# Patient Record
Sex: FEMALE | Race: WHITE | Employment: UNEMPLOYED | URBAN - METROPOLITAN AREA
[De-identification: names, ages, dates, MRNs, and addresses within clinical notes are randomized per-mention and may not be internally consistent; named-entity substitution may affect disease eponyms.]

---

## 2017-09-20 ENCOUNTER — HOSPITAL ENCOUNTER (EMERGENCY)
Age: 22
Discharge: HOME | End: 2017-09-20
Attending: EMERGENCY MEDICINE
Payer: MEDICAID

## 2017-09-20 ENCOUNTER — APPOINTMENT (OUTPATIENT)
Dept: ULTRASOUND IMAGING | Age: 22
End: 2017-09-20
Payer: MEDICAID

## 2017-09-20 VITALS
SYSTOLIC BLOOD PRESSURE: 115 MMHG | OXYGEN SATURATION: 98 % | HEART RATE: 94 BPM | BODY MASS INDEX: 30.14 KG/M2 | DIASTOLIC BLOOD PRESSURE: 60 MMHG | WEIGHT: 187.56 LBS | HEIGHT: 66 IN | TEMPERATURE: 97.9 F | RESPIRATION RATE: 18 BRPM

## 2017-09-20 DIAGNOSIS — O20.0 THREATENED MISCARRIAGE: Primary | ICD-10-CM

## 2017-09-20 LAB
ABO/RH: NORMAL
ABSOLUTE EOS #: 0.2 K/UL (ref 0–0.4)
ABSOLUTE LYMPH #: 2.3 K/UL (ref 1.2–5.2)
ABSOLUTE MONO #: 0.8 K/UL (ref 0.2–0.8)
ANION GAP SERPL CALCULATED.3IONS-SCNC: 11 MMOL/L (ref 9–17)
BASOPHILS # BLD: 1 %
BASOPHILS ABSOLUTE: 0.1 K/UL (ref 0–0.2)
BUN BLDV-MCNC: 7 MG/DL (ref 6–20)
BUN/CREAT BLD: 16 (ref 9–20)
CALCIUM SERPL-MCNC: 8.8 MG/DL (ref 8.6–10.4)
CHLORIDE BLD-SCNC: 101 MMOL/L (ref 98–107)
CO2: 22 MMOL/L (ref 20–31)
CREAT SERPL-MCNC: 0.44 MG/DL (ref 0.5–0.9)
DIFFERENTIAL TYPE: ABNORMAL
DIRECT EXAM: NORMAL
EOSINOPHILS RELATIVE PERCENT: 1 %
GFR AFRICAN AMERICAN: >60 ML/MIN
GFR NON-AFRICAN AMERICAN: >60 ML/MIN
GFR SERPL CREATININE-BSD FRML MDRD: ABNORMAL ML/MIN/{1.73_M2}
GFR SERPL CREATININE-BSD FRML MDRD: ABNORMAL ML/MIN/{1.73_M2}
GLUCOSE BLD-MCNC: 82 MG/DL (ref 70–99)
HCT VFR BLD CALC: 33.7 % (ref 36–46)
HEMOGLOBIN: 11.4 G/DL (ref 12–16)
LYMPHOCYTES # BLD: 19 %
Lab: NORMAL
MCH RBC QN AUTO: 29.8 PG (ref 26–34)
MCHC RBC AUTO-ENTMCNC: 33.9 G/DL (ref 31–37)
MCV RBC AUTO: 87.9 FL (ref 80–100)
MONOCYTES # BLD: 6 %
PDW BLD-RTO: 14 % (ref 11.5–14.5)
PLATELET # BLD: 283 K/UL (ref 130–400)
PLATELET ESTIMATE: ABNORMAL
PMV BLD AUTO: 7.1 FL (ref 6–12)
POTASSIUM SERPL-SCNC: 3.9 MMOL/L (ref 3.7–5.3)
RBC # BLD: 3.83 M/UL (ref 4–5.2)
RBC # BLD: ABNORMAL 10*6/UL
SEG NEUTROPHILS: 73 %
SEGMENTED NEUTROPHILS ABSOLUTE COUNT: 9.3 K/UL (ref 1.8–8)
SODIUM BLD-SCNC: 134 MMOL/L (ref 135–144)
SPECIMEN DESCRIPTION: NORMAL
STATUS: NORMAL
WBC # BLD: 12.7 K/UL (ref 4.5–13.5)
WBC # BLD: ABNORMAL 10*3/UL

## 2017-09-20 PROCEDURE — 99284 EMERGENCY DEPT VISIT MOD MDM: CPT

## 2017-09-20 PROCEDURE — 87510 GARDNER VAG DNA DIR PROBE: CPT

## 2017-09-20 PROCEDURE — 87480 CANDIDA DNA DIR PROBE: CPT

## 2017-09-20 PROCEDURE — 86900 BLOOD TYPING SEROLOGIC ABO: CPT

## 2017-09-20 PROCEDURE — 86901 BLOOD TYPING SEROLOGIC RH(D): CPT

## 2017-09-20 PROCEDURE — 76815 OB US LIMITED FETUS(S): CPT

## 2017-09-20 PROCEDURE — 80048 BASIC METABOLIC PNL TOTAL CA: CPT

## 2017-09-20 PROCEDURE — 85025 COMPLETE CBC W/AUTO DIFF WBC: CPT

## 2017-09-20 PROCEDURE — 87591 N.GONORRHOEAE DNA AMP PROB: CPT

## 2017-09-20 PROCEDURE — 87660 TRICHOMONAS VAGIN DIR PROBE: CPT

## 2017-09-20 PROCEDURE — 87491 CHLMYD TRACH DNA AMP PROBE: CPT

## 2017-09-20 ASSESSMENT — ENCOUNTER SYMPTOMS
SHORTNESS OF BREATH: 0
BACK PAIN: 0
ABDOMINAL PAIN: 0
NAUSEA: 0
DIARRHEA: 0
VOMITING: 0
COLOR CHANGE: 0
COUGH: 0

## 2017-09-21 LAB
C TRACH DNA GENITAL QL NAA+PROBE: NEGATIVE
N. GONORRHOEAE DNA: NEGATIVE

## 2017-10-04 ENCOUNTER — APPOINTMENT (OUTPATIENT)
Dept: GENERAL RADIOLOGY | Age: 22
End: 2017-10-04
Payer: MEDICAID

## 2017-10-04 ENCOUNTER — HOSPITAL ENCOUNTER (EMERGENCY)
Age: 22
Discharge: HOME | End: 2017-10-04
Attending: EMERGENCY MEDICINE
Payer: MEDICAID

## 2017-10-04 VITALS
WEIGHT: 180 LBS | BODY MASS INDEX: 29.05 KG/M2 | TEMPERATURE: 97.5 F | HEART RATE: 88 BPM | RESPIRATION RATE: 16 BRPM | DIASTOLIC BLOOD PRESSURE: 57 MMHG | SYSTOLIC BLOOD PRESSURE: 99 MMHG | OXYGEN SATURATION: 97 %

## 2017-10-04 DIAGNOSIS — S91.312A: Primary | ICD-10-CM

## 2017-10-04 PROCEDURE — 12001 RPR S/N/AX/GEN/TRNK 2.5CM/<: CPT

## 2017-10-04 PROCEDURE — G0381 LEV 2 HOSP TYPE B ED VISIT: HCPCS

## 2017-10-04 PROCEDURE — 6370000000 HC RX 637 (ALT 250 FOR IP): Performed by: PHYSICIAN ASSISTANT

## 2017-10-04 PROCEDURE — 73630 X-RAY EXAM OF FOOT: CPT

## 2017-10-04 RX ORDER — ACETAMINOPHEN 325 MG/1
650 TABLET ORAL ONCE
Status: COMPLETED | OUTPATIENT
Start: 2017-10-04 | End: 2017-10-04

## 2017-10-04 RX ORDER — LIDOCAINE HYDROCHLORIDE 10 MG/ML
20 INJECTION, SOLUTION INFILTRATION; PERINEURAL ONCE
Status: DISCONTINUED | OUTPATIENT
Start: 2017-10-04 | End: 2017-10-04 | Stop reason: HOSPADM

## 2017-10-04 RX ADMIN — ACETAMINOPHEN 650 MG: 325 TABLET ORAL at 18:48

## 2017-10-04 ASSESSMENT — ENCOUNTER SYMPTOMS
ALLERGIC/IMMUNOLOGIC NEGATIVE: 1
GASTROINTESTINAL NEGATIVE: 1
EYES NEGATIVE: 1
RESPIRATORY NEGATIVE: 1

## 2017-10-04 ASSESSMENT — PAIN SCALES - GENERAL
PAINLEVEL_OUTOF10: 7
PAINLEVEL_OUTOF10: 7

## 2017-10-04 NOTE — ED PROVIDER NOTES
[facility]     Emergency Department     Faculty Attestation    I performed a history and physical examination of the patient and discussed management with the resident. I reviewed the residents note and agree with the documented findings including all diagnostic interpretations and plan of care. Any areas of disagreement are noted on the chart. I was personally present for the key portions of any procedures. I have documented in the chart those procedures where I was not present during the key portions. I have reviewed the emergency nurses triage note. I agree with the chief complaint, past medical history, past surgical history, allergies, medications, social and family history as documented unless otherwise noted below. Documentation of the HPI, Physical Exam and Medical Decision Making performed by millaibdago is based on my personal performance of the HPI, PE and MDM. For Physician Assistant/ Nurse Practitioner cases/documentation I have personally evaluated this patient and have completed at least one if not all key elements of the E/M (history, physical exam, and MDM). Additional findings are as noted. Primary Care Physician: No primary care provider on file. History: This is a 23 y.o. female who presents to the Emergency Department with complaint of foot laceration. Patient cut her foot on broken porcelain sink that was thrown into her front yard. Denies any numbness or tingling, no weakness, no blood thinners. No other injuries. Tetanus is up-to-date. Physical:     weight is 180 lb (81.6 kg). Her oral temperature is 97.5 °F (36.4 °C). Her blood pressure is 99/57 (abnormal) and her pulse is 88. Her respiration is 16 and oxygen saturation is 97%.    23 y.o. female no acute distress, left foot shows a 1.5 cm linear laceration along the superior aspect of the arch of the foot.   Sensation is intact in all 5 toes, able flex and extend the toes with no limitation, no active bleeding, capillary refill less than 2 seconds, DP pulse 2+. Impression: Foot laceration    Plan: X-ray for foreign body, wound explanation, laceration repair      Yamileth Shepherd MD  Attending Emergency Physician        Wing Peacock MD  10/04/17 1823      7:52 PM laceration repair performed by medical student under my direct supervision. We will discharge at this time, follow-up in 7-10 days for suture removal at PCP or in emergency department. Laceration Repair Procedure Note    Indication: Laceration    Procedure: The patient was placed in the appropriate position and anesthesia around the laceration was obtained by infiltration using 1% Lidocaine without epinephrine. The area was then cleansed with betadine and draped in a sterile fashion, irrigated with normal saline and explored with no tendon injury noted. The laceration was closed with 4-0 Prolene using interrupted sutures. There were no additional lacerations requiring repair. The wound area was then dressed with bacitracin. Total repaired wound length: 1.5 cm. Other Items: Suture count: 3    The patient tolerated the procedure well.     Complications: None           Wing Peacock MD  10/04/17 9622

## 2017-10-04 NOTE — ED PROVIDER NOTES
[facility]  ED  Emergency Department Encounter  Mid Level Provider     Pt Name: Sally Estes  MRN: [id number]  [place] 1995  Date of evaluation: 10/4/17  PCP:  No primary care provider on file. CHIEF COMPLAINT       Chief Complaint   Patient presents with    Laceration     Medial left foot, plantar aspect, approc 2-3 cm. HISTORY OF PRESENT ILLNESS  (Location/Symptom, Timing/Onset, Context/Setting, Quality, Duration, Modifying Factors, Severity.)      iGselle Mosqueda is a 23 y.o. female who presents with Laceration to the left foot. Patient states that she had carried groceries and was trying to walk into her house, but the sink had been placed into her yard and she was trying to step around it and ended up tripping and cutting her left foot, plantar surface on the same. Her  wrapped her foot in a pillowcase and brought her here. She doesn't have any medical problems. She is about 20-23 weeks pregnant. She has not yet gotten [employer] on following here at [facility]. V's. Denies allergies. States her last tetanus was one to 2 years ago. Patient is not having any abdominal pain, vaginal bleeding or discharge today. PAST MEDICAL / SURGICAL / SOCIAL / FAMILY HISTORY      has no past medical history on file. has no past surgical history on file. Social History     Social History    Marital status: Single     Spouse name: N/A    Number of children: N/A    Years of education: N/A     Occupational History    Not on file. Social History Main Topics    Smoking status: Never Smoker    Smokeless tobacco: Not on file    Alcohol use No    Drug use: No    Sexual activity: Not on file     Other Topics Concern    Not on file     Social History Narrative       History reviewed. No pertinent family history. Allergies:  Review of patient's allergies indicates no known allergies.     Home Medications:  Prior to Admission medications    Not on File       REVIEW OF SYSTEMS    (2-9 systems for level 4, 10 or more for level 5)      Review of Systems   Constitutional: Negative. HENT: Negative. Eyes: Negative. Respiratory: Negative. Cardiovascular: Negative. Gastrointestinal: Negative. Endocrine: Negative. Genitourinary: Negative. Musculoskeletal: Negative. Skin: Negative. Laceration to left foot, dorsal medial aspect   Allergic/Immunologic: Negative. Neurological: Negative. Hematological: Negative. Psychiatric/Behavioral: Negative. PHYSICAL EXAM   (up to 7 for level 4, 8 or more for level 5)      INITIAL VITALS:  weight is 180 lb (81.6 kg). Her oral temperature is 97.5 °F (36.4 °C). Her blood pressure is 99/57 (abnormal) and her pulse is 88. Her respiration is 16 and oxygen saturation is 97%. Physical Exam   Constitutional: She is oriented to person, place, and time. Vital signs are normal. She appears well-developed and well-nourished. HENT:   Head: Normocephalic and atraumatic. Nose: Nose normal.   Mouth/Throat: Mucous membranes are normal.   Eyes: Conjunctivae are normal. Pupils are equal, round, and reactive to light. Neck: Trachea normal, normal range of motion and full passive range of motion without pain. Cardiovascular: S1 normal, S2 normal and intact distal pulses. Pulses:       Radial pulses are 2+ on the right side, and 2+ on the left side. Pulmonary/Chest: Effort normal.   Abdominal: Normal appearance. Musculoskeletal: Normal range of motion. Neurological: She is alert and oriented to person, place, and time. She has normal strength. Skin: Skin is warm and intact. Laceration to the left foot, present over the dorsal medial aspect of the foot but not over any of the extensor tendons. The laceration is approximately 1.5 cm it is not actively oozing any blood. Patient has full dorsi and plantar flexion of all the digits of her left foot with brisk capillary refill.   2+ DP and PT pulses Psychiatric: She has a normal mood and affect. Her speech is normal and behavior is normal. Judgment and thought content normal.   Nursing note and vitals reviewed. DIFFERENTIAL  DIAGNOSIS   Laceration of left foot. Retained foreign body of the left foot  Fracture of left first metatarsal  PLAN (LABS / IMAGING / EKG):  Orders Placed This Encounter   Procedures    XR FOOT LEFT (MIN 3 VIEWS)       MEDICATIONS ORDERED:  Orders Placed This Encounter   Medications    acetaminophen (TYLENOL) tablet 650 mg    lidocaine 1 % injection 20 mL       Controlled Substances Monitoring:      DIAGNOSTIC RESULTS / EMERGENCY DEPARTMENT COURSE / MDM     RADIOLOGY:   I directly visualized (with the attending physician) the following  images and reviewed the radiologist interpretations:  Us Ob 1 Or More Fetus Limited    Result Date: 9/20/2017  EXAMINATION: 2nd trimester OB ultrasound 9/20/2017 COMPARISON: None HISTORY: ORDERING SYSTEM PROVIDED HISTORY: bleeding in pregnancy FINDINGS: A single live intrauterine pregnancy is present. Fetal heart rate measures 159 beats per minute. There is normal fetal body and limb movement. The fetus lie is variable with cephalic and breech positions noted. The placenta is located posterior and left. BPD measures 4.4 cm. Head circumference measures 16.8 cm. Abdominal circumference measures 17.1 cm. Femur length measures 3.1 cm. Estimated fetal weight is 370.3 grams. Estimated gestational age by current ultrasound is 20 weeks and 1 day. Cervical length is 3.3 cm. Questionable cervical funneling. Single live intrauterine pregnancy with gestational age of 25 weeks and 1 day by current sonographic biometry. The estimated due date is February 6, 2018. Questionable funneling of the cervix. LABS:  No results found for this visit on 10/04/17.     EMERGENCY DEPARTMENT COURSE:  Left foot xray, tetanus was updated about 1-2 years ago, is 22-23wks pregnant with no abdominal pain, vag bleeding or vag discharge    PROCEDURES:  None    FINAL IMPRESSION      1. Laceration of left foot excluding toes, initial encounter          DISPOSITION / PLAN     DISPOSITION   Sign out to Dr. Barby Henderson pending x-ray and laceration repair  PATIENT REFERRED TO:  No follow-up provider specified.     DISCHARGE MEDICATIONS:  New Prescriptions    No medications on file       Federico Babb PA-C PA-C  Emergency Medicine Physician Assistant    (Please note that portions of this note were completed with a voice recognition program.  Efforts were made to edit the dictations but occasionally words are mis-transcribed.)     Federico Babb PA-C  10/04/17 2095

## 2017-10-04 NOTE — ED AVS SNAPSHOT
After Visit Summary  (Discharge Instructions)    Medication List for Home    Based on the information you provided to us as well as any changes during this visit, the following is your updated medication list.  Compare this with your prescription bottles at home. If you have any questions or concerns, contact your primary care physician's office. Daily Medication List (This medication list can be shared with any Healthcare provider who is helping you manage your medications)      Notice     You have not been prescribed any medications. Allergies as of 10/4/2017     No Known Allergies      Immunizations as of 10/4/2017     No immunizations on file. After Visit Summary    This summary was created for you. Thank you for entrusting your care to us. The following information includes details about your hospital/visit stay along with steps you should take to help with your recovery once you leave the hospital.  In this packet, you will find information about the topics listed below:    · Instructions about your medications including a list of your home medications  · A summary of your hospital visit  · Follow-up appointments once you have left the hospital  · Your care plan at home      You may receive a survey regarding the care you received during your stay. Your input is valuable to us. We encourage you to complete and return your survey in the envelope provided. We hope you will choose us in the future for your healthcare needs. Patient Information     Patient Name JASMINA Alvarez 1995      Care Provided at:     Name Address Phone       [facility] [address] [facility] [phone]            Your Visit    Here you will find information about your visit, including the reason for your visit.   Please take this sheet with you when you visit your doctor or other health care provider in the future. It will help determine the best possible medical care for you at that time. If you have any questions once you leave the hospital, please call the department phone number listed below. Diagnoses this visit     Your diagnosis was LACERATION OF LEFT FOOT EXCLUDING TOES, INITIAL ENCOUNTER. Visit Information     Date of Visit Department Dept Phone    10/4/2017 [facility] -[phone]      You were seen by     You were seen by Chrystal Workman MD and Silvino Garcia PA-C. Follow-up Appointments    Below is a list of your follow-up and future appointments. This may not be a complete list as you may have made appointments directly with providers that we are not aware of or your providers may have made some for you. Please call your providers to confirm appointments. It is important to keep your appointments. Please bring your current insurance card, photo ID, co-pay, and all medication bottles to your appointment. If self-pay, payment is expected at the time of service. Follow-up Information     Follow up with [facility] ED In 1 week. Specialty:  Emergency Medicine    Why:  For suture removal    Contact information:    [address] [place] [address] [phone]        Follow up with PCP In 1 week. Why:  For suture removal      Preventive Care        Date Due    HIV screening is recommended for all people regardless of risk factors  aged 15-65 years at least once (lifetime) who have never been HIV tested.  3/12/2012    Meningococcal Vaccine (1 of 1) 3/12/2013    Tetanus Combination Vaccine (1 - Tdap) 3/12/2016    Yearly Flu Vaccine (1) 9/1/2017                 Care Plan Once You Return Home    This section includes instructions you will need to follow once you leave the hospital.  Your care team will discuss these with you, so you and 6. Create a CrossReader password. You can change your password at any time. 7. Enter your Password Reset Question and Answer. This can be used at a later time if you forget your password. 8. Enter your e-mail address. You will receive e-mail notification when new information is available in [address] 9. Click Sign Up. You can now view your medical record. Additional Information  If you have questions, please contact the physician practice where you receive care. Remember, CrossReader is NOT to be used for urgent needs. For medical emergencies, dial 911. For questions regarding your [employer] account call [phone]. If you have a clinical question, please call your doctor's office. View your information online  ? Review your current list of  medications, immunization, and allergies. ? Review your future test results online . ? Review your discharge instructions provided by your caregivers at discharge    Certain functionality such as prescription refills, scheduling appointments or sending messages to your provider are not activated if your provider does not use [employer] in his/her office    For questions regarding your [employer] account call [phone]. If you have a clinical question, please call your doctor's office. The information on all pages of the After Visit Summary has been reviewed with me, the patient and/or responsible adult, by my health care provider(s). I had the opportunity to ask questions regarding this information. I understand I should dispose of my armband safely at home to protect my health information. A complete copy of the After Visit Summary has been given to me, the patient and/or responsible adult.          Patient Signature/Responsible Adult: ___________________________________    Nurse Signature: ___________________________________  Resident/MLP Signature: ___________________________________  Attending Signature: ___________________________________ Date:____________Time:____________              Discharge Instructions       THANK YOU!!!    From [facility]    On behalf of the Emergency Department staff at [facility]. [facility] Emergency Department, I would like to thank you for giving [facility] the opportunity to address your health care needs and concerns. We hope that during your visit, our service was delivered in a professional and caring manner. Please keep [facility] in mind as we walk with you down the path to your own personal wellness. Call today or tomorrow to follow up with PCP  in 7-10 days or return to the ER to have your sutures removed. Use ibuprofen or Tylenol (unless prescribed medications that have Tylenol in it) for pain. You can take over the counter Ibuprofen (advil) tablets (4 tablets every 8 hours or 3 tablets every 6 hours or 2 tablets every 4 hours)    You can shower with the laceration, would avoid baths or swimming in lakes / rivers. Apply bacitracin / triple antibiotic ointment / Neosporin to the wound twice a day. Place sunscreen on the healing wound for the next year to help with scarring. Return to the emergency department for worsening of pain, fever > 101.5, redness around the wound or redness streaking up the body part, white drainage from wound. Cuts Closed With Stitches: Care Instructions  Your Care Instructions  A cut can happen anywhere on your body. The doctor used stitches to close the cut. Using stitches also helps the cut heal and reduces scarring. Sometimes pieces of tape called Steri-Strips are put over the stitches. If the cut went deep and through the skin, the doctor may have put in two layers of stitches. The deeper layer brings the deep part of the cut together. These stitches will dissolve and don't need to be removed. The stitches in the upper layer are the ones you see on the cut. Call your doctor now or seek immediate medical care if:  · You have new pain, or your pain gets worse. · The skin near the cut is cold or pale or changes color. · You have tingling, weakness, or numbness near the cut. · The cut starts to bleed, and blood soaks through the bandage. Oozing small amounts of blood is normal.  · You have trouble moving the area near the cut. · You have symptoms of infection, such as:  ¨ Increased pain, swelling, warmth, or redness around the cut. ¨ Red streaks leading from the cut. ¨ Pus draining from the cut. ¨ A fever. Watch closely for changes in your health, and be sure to contact your doctor if:  · The cut reopens. · You do not get better as expected. Where can you learn more? Go to [email or web address] and sign in to your [employer] account. Enter R217 in the Matchpoint box to learn more about \"Cuts Closed With Stitches: Care Instructions. \"     If you do not have an account, please click on the \"Sign Up Now\" link. Current as of: March 20, 2017  Content Version: 11.3  © 2647-6825 [employer]. Care instructions adapted under license by [facility] ([facility]). If you have questions about a medical condition or this instruction, always ask your healthcare professional. [address] any warranty or liability for your use of this information.     [facility] ED [facility]  [address] [facility]  [phone] Pediatric Primary Care  Adult Primary Care  OB/GYN/Prenatal/Specialty Clinics Monday  Friday  [address] [phone] Assistance with applying for chronic long term meds (high BP, Diabetes, etc), offered thru programs made available by various pharmaceutical companies Monday  Friday  Call to make an appointment   [address]  [phone] Pediatrics and [facility] Practice Monday  Friday  9a  7p [address] [facility]  [address]  [phone] Adult Medicine, Pediatrics, OB/GYN Monday  Friday  8:30a  5p   [facility]  [address] [facility]  [phone]  Wednesday AM each week   [facility]  [address] [facility] Internal Medicine   REHABILITATION [facility]  [phone]  [facility]  [phone]    [facility]  [phone]  [phone] Monday, Tuesday, Thursday, Friday  8a  4p  Wednesday 1p  4p  Monday, Tuesday, Thursday, Friday  10a  4p  Wednesday 1p  4p  Monday, Tuesday, Thursday, Friday  8:30a  4:15p  Wednesday 12:30p  4:15p  Monday, Wednesday, Friday  1p  5p   [address] [facility] [address]  [phone] Pediatric Primary Care  Adult Primary Care  OB/Prenatal Monday  Wednesday, Friday Monday  Friday 8a  12p  Thursday 8a  4:45p   [facility]  [address] [facility]  [phone]  [address] [place] [address]  [phone] Pregnancy  Pre & Post Adoption Counseling  Pregnancy Support  Prenatal / nutrition Care  Reward Incentive Program Mon & Thurs (10a  2p)  Tues (10a  430p)  Cheng Nicole ([address] [facility] 1p)   [address]  [address]   [phone]  Adult Internal Medicine   [phone]  Pediatrics  [phone]  Neuro / Headache  [phone] Monday  Friday  8:30a  5p   [facility]  [address]  [phone] Family Practice Monday  Friday  9a  5p   [facility]  [address] [facility]  [phone] Family Practice Monday, Tuesday, Thursday, Friday  9a  5p  (closed 12p  1p)  Wednesday 1p  5p   [address]  [phone] Burn/Plastric, ENT, GI, Orthopedics, Surgical / Trauma, Urology, Vascular Call for an appointment   [facility]  [address]  [phone] Adult Medicine, [address] [facility], Dental  Patient must be certified homeless  Under age 25 not accepted Days and hours vary (Doors open at 8:30a  day of week varies) Call for an appointment     [facility]  [address] [facility]   [phone]  OB   [phone] Monday, Tuesday, Wednesday, Friday 9a  5p  Thursday 9a  6p  Wednesday (OB only)   [facility]  [address]  [phone] [place]  [phone]   OB/Prenatal      OB/Prenatal Monday  Thursday pa  6p  Friday 10a  3p  Saturday 1st & 3rd 10a  2p  Wednesday 6p  8p (HIV Testing)  Monday  Friday  10a  3p   Pregnancy Center [facility]  [phone] Free nurse visits  [place] programs  Counseling  Pregnancy Class Call   [facility]  [phone] Various Clinics [facility]  [facility] [address], [address] [facility]  [phone] OB/Prenatal    Family Practice Tuesday 8a  4:45p    Monday  Wednesday, Friday  8a  4:45p   [address]  [address]  [phone] Family Practice Monday  Friday  8a  4:30p   [facility]  [address] [facility], [address]  [phone] [address] [facility], [facility] [address]  [phone]    Monday  Friday  8a  4:30p    Monday  Friday 8a  4:30p  Thursday 8a  [address] Professional Stuart Marcial  [phone]  Monday  Friday  9a  5p   [facility]  [phone]  Call for an appointment   [address]   [address]  [phone]  Monday  Friday  8:30a [address] [facility]  [address]  [phone] Must have source of income and must bring (2) recent check stubs to appointment By appointment only   [facility] for [facility]  [address] [facility]  [phone] Patient must be homeless, call for   eligibility guidelines.   Under age 25 NOT accepted Days and hours vary (Doors open at 8:30a  day of week varies)  Call for an appointment Miscellaneous Information     [employer]  [phone])  Call for an appointment   [facility]   ([address] [place] [address])  [phone]   toll free [phone] For financial assistance

## 2017-11-20 ENCOUNTER — HOSPITAL ENCOUNTER (EMERGENCY)
Age: 22
Discharge: ANOTHER HOSPITAL | End: 2017-11-21
Attending: EMERGENCY MEDICINE
Payer: COMMERCIAL

## 2017-11-20 DIAGNOSIS — O60.03 PRETERM LABOR IN THIRD TRIMESTER WITHOUT DELIVERY: Primary | ICD-10-CM

## 2017-11-20 PROCEDURE — 99284 EMERGENCY DEPT VISIT MOD MDM: CPT

## 2017-11-20 RX ORDER — SODIUM CHLORIDE 9 MG/ML
INJECTION, SOLUTION INTRAVENOUS CONTINUOUS
Status: DISCONTINUED | OUTPATIENT
Start: 2017-11-21 | End: 2017-11-21 | Stop reason: HOSPADM

## 2017-11-21 ENCOUNTER — HOSPITAL ENCOUNTER (OUTPATIENT)
Age: 22
Discharge: HOME | End: 2017-11-21
Attending: OBSTETRICS & GYNECOLOGY | Admitting: OBSTETRICS & GYNECOLOGY
Payer: COMMERCIAL

## 2017-11-21 VITALS
HEART RATE: 90 BPM | RESPIRATION RATE: 14 BRPM | SYSTOLIC BLOOD PRESSURE: 116 MMHG | DIASTOLIC BLOOD PRESSURE: 59 MMHG | TEMPERATURE: 98 F

## 2017-11-21 VITALS
OXYGEN SATURATION: 97 % | WEIGHT: 211.25 LBS | DIASTOLIC BLOOD PRESSURE: 66 MMHG | HEART RATE: 105 BPM | BODY MASS INDEX: 34.1 KG/M2 | TEMPERATURE: 98.3 F | SYSTOLIC BLOOD PRESSURE: 127 MMHG

## 2017-11-21 PROBLEM — Z33.1 IUP (INTRAUTERINE PREGNANCY), INCIDENTAL: Status: ACTIVE | Noted: 2017-11-21

## 2017-11-21 PROBLEM — Z3A.29 29 WEEKS GESTATION OF PREGNANCY: Status: ACTIVE | Noted: 2017-11-21

## 2017-11-21 PROBLEM — Z34.90 PREGNANCY: Status: ACTIVE | Noted: 2017-11-21

## 2017-11-21 LAB
-: ABNORMAL
AMORPHOUS: ABNORMAL
BACTERIA: ABNORMAL
BILIRUBIN URINE: NEGATIVE
C TRACH DNA GENITAL QL NAA+PROBE: NEGATIVE
CASTS UA: ABNORMAL /LPF (ref 0–2)
COLOR: YELLOW
COMMENT UA: ABNORMAL
CRYSTALS, UA: ABNORMAL /HPF
DIRECT EXAM: ABNORMAL
EPITHELIAL CELLS UA: ABNORMAL /HPF (ref 0–5)
GLUCOSE URINE: NEGATIVE
KETONES, URINE: NEGATIVE
LEUKOCYTE ESTERASE, URINE: ABNORMAL
Lab: ABNORMAL
MUCUS: ABNORMAL
N. GONORRHOEAE DNA: NEGATIVE
NITRITE, URINE: NEGATIVE
OTHER OBSERVATIONS UA: ABNORMAL
PH UA: 6.5 (ref 5–8)
PROTEIN UA: NEGATIVE
RBC UA: ABNORMAL /HPF (ref 0–2)
RENAL EPITHELIAL, UA: ABNORMAL /HPF
SPECIFIC GRAVITY UA: 1 (ref 1–1.03)
SPECIMEN DESCRIPTION: ABNORMAL
STATUS: ABNORMAL
TRICHOMONAS: ABNORMAL
TURBIDITY: CLEAR
URINE HGB: NEGATIVE
UROBILINOGEN, URINE: NORMAL
WBC UA: ABNORMAL /HPF (ref 0–5)
YEAST: ABNORMAL

## 2017-11-21 PROCEDURE — 2580000003 HC RX 258: Performed by: EMERGENCY MEDICINE

## 2017-11-21 PROCEDURE — 87480 CANDIDA DNA DIR PROBE: CPT

## 2017-11-21 PROCEDURE — 81001 URINALYSIS AUTO W/SCOPE: CPT

## 2017-11-21 PROCEDURE — 99213 OFFICE O/P EST LOW 20 MIN: CPT

## 2017-11-21 PROCEDURE — 96365 THER/PROPH/DIAG IV INF INIT: CPT

## 2017-11-21 PROCEDURE — 99234 HOSP IP/OBS SM DT SF/LOW 45: CPT | Performed by: OBSTETRICS & GYNECOLOGY

## 2017-11-21 PROCEDURE — 6360000002 HC RX W HCPCS: Performed by: EMERGENCY MEDICINE

## 2017-11-21 PROCEDURE — 87660 TRICHOMONAS VAGIN DIR PROBE: CPT

## 2017-11-21 PROCEDURE — 87510 GARDNER VAG DNA DIR PROBE: CPT

## 2017-11-21 PROCEDURE — 87591 N.GONORRHOEAE DNA AMP PROB: CPT

## 2017-11-21 PROCEDURE — 87086 URINE CULTURE/COLONY COUNT: CPT

## 2017-11-21 PROCEDURE — 87491 CHLMYD TRACH DNA AMP PROBE: CPT

## 2017-11-21 RX ORDER — FLUCONAZOLE 150 MG/1
150 TABLET ORAL ONCE
Qty: 1 TABLET | Refills: 0 | Status: SHIPPED | OUTPATIENT
Start: 2017-11-21 | End: 2017-11-21

## 2017-11-21 RX ORDER — ACETAMINOPHEN 500 MG
1000 TABLET ORAL EVERY 6 HOURS PRN
Status: DISCONTINUED | OUTPATIENT
Start: 2017-11-21 | End: 2017-11-21 | Stop reason: HOSPADM

## 2017-11-21 RX ORDER — BETAMETHASONE SODIUM PHOSPHATE AND BETAMETHASONE ACETATE 3; 3 MG/ML; MG/ML
12 INJECTION, SUSPENSION INTRA-ARTICULAR; INTRALESIONAL; INTRAMUSCULAR; SOFT TISSUE ONCE
Status: DISCONTINUED | OUTPATIENT
Start: 2017-11-21 | End: 2017-11-21 | Stop reason: HOSPADM

## 2017-11-21 RX ADMIN — SODIUM CHLORIDE: 9 INJECTION, SOLUTION INTRAVENOUS at 00:20

## 2017-11-21 RX ADMIN — MAGNESIUM SULFATE HEPTAHYDRATE 4 G: 500 INJECTION, SOLUTION INTRAMUSCULAR; INTRAVENOUS at 00:20

## 2017-11-21 ASSESSMENT — PAIN DESCRIPTION - LOCATION: LOCATION: ABDOMEN

## 2017-11-21 ASSESSMENT — PAIN SCALES - GENERAL: PAINLEVEL_OUTOF10: 9

## 2017-11-21 ASSESSMENT — PAIN DESCRIPTION - PAIN TYPE: TYPE: ACUTE PAIN

## 2017-11-21 NOTE — ED PROVIDER NOTES
well-hydrated female. She is normotensive. Vital signs stable to include pulse ox 97% on room air. She is not hypoxic. She is alert conversive and appropriate in behavior. Heart regular rate and rhythm normal S1-S2. Lungs are clear to auscultation without wheezes rales rhonchi. Abdomen is gravid with fundal height above the umbilicus. Fetal heart tones are requested. Bimanual exam demonstrates fetal head engaged. Cervix is soft and approximately 3 cm although it is a difficult exam due to patient discomfort. She does have fluid present in the vaginal vault with particulate matter. I have no ability to assess ferning or pH. DIAGNOSTIC RESULTS         LABS:  Labs Reviewed   URINE CULTURE   CBC WITH AUTO DIFFERENTIAL   HEPATIC FUNCTION PANEL   BASIC METABOLIC PANEL   URINALYSIS       All other labs were within normal range or not returned as of this dictation. EMERGENCY DEPARTMENT COURSE and DIFFERENTIAL DIAGNOSIS/MDM:   Vitals:    Vitals:    17 0000   BP: 127/66   Pulse: 105   Temp: 98.3 °F (36.8 °C)   SpO2: 97%   Weight: 211 lb 4 oz (95.8 kg)     Patient is evaluated. She presents with  labor. We have no ability to monitor or care for her medical condition. At the current time she is stable for transport. Transport is immediately contacted to facilitate the most expedient transfer to labor and delivery at [facility]. [place]. I also spoke with the [facility] on-call for labor and delivery. We did discuss patient's presentation. Following this conversation patient is treated with betamethasone 12.5 mg IM as well as a magnesium 4 g loading dose. She remained on telemetry. She'll be transferred to [facility]. [place] for further care    CONSULTS:  IP CONSULT TO OB GYN    PROCEDURES:  None    FINAL IMPRESSION      1.  labor in third trimester without delivery          DISPOSITION/PLAN   DISPOSITION Decision to Transfer    PATIENT REFERRED TO:   No follow-up provider specified.     DISCHARGE

## 2017-11-21 NOTE — H&P
SAB0   TAB0   Ectopic0   Molar0   Multiple0   Live Births0       # Outcome Date GA Lbr Frac[place]/2nd Weight Sex Delivery Anes PTL Lv   1 Current                   PAST MEDICAL HISTORY:   has no past medical history on file. PAST SURGICAL HISTORY:   has no past surgical history on file. ALLERGIES:  has No Known Allergies. MEDICATIONS:  Prior to Admission medications    Not on File       FAMILY HISTORY:  family history is not on file. SOCIAL HISTORY:   reports that she has never smoked. She does not have any smokeless tobacco history on file. She reports that she does not drink alcohol or use drugs.     VITALS:  Vitals:    11/21/17 0135   BP: (!) 116/59   Pulse: 90   Resp: 14   Temp: 98 °F (36.7 °C)   TempSrc: Oral       PHYSICAL EXAM:  Fetal Heart Monitor:  Baseline Heart Rate 130, moderate variability, present accelerations, absent decelerations  [place]: contractions, none    General appearance:  no apparent distress, alert and cooperative  Neurologic:  alert, oriented, normal speech, no focal findings or movement disorder noted  Lungs:  No increased work of breathing, good air exchange, clear to auscultation bilaterally, no crackles or wheezing  Heart:  regular rate and rhythm and no murmur    Abdomen:  soft, gravid, non-tender, no right upper quadrant tenderness, no CVA tenderness, uterus non-tender, no signs of abruption and no signs of chorioamnionitis  Extremities:  no calf tenderness, non edematous, DTRs: normal    Pelvic Exam:   Speculum: No external lesions or erythema, normal appearing vaginal mucosa that is pink and moist, no blood noted in the vaginal canal or at the cervical os, small amount of thick physiologic discharge in vault, cervix visually closed and long without lesions or erythema  Cervix Check: fingertip dilated, 0 % effaced, -3 station, posterior position, medium consistency      DATA:  Membranes Ruptured: No  Fern: negative  Nitrazine: Not done  Valsalva/Pooling: absent  Vaginal Bleeding: absent    Wet prep: Clue cells Present, Trichomonas Absent   KOH:  Yeast or Hyphae present       PRENATAL LAB RESULTS:   Blood Type/Rh: A pos  Antibody Screen: negative  Hemoglobin, Hematocrit, Platelets: 69.8 / 81.4 / 288  Rubella: unknown  T. Pallidum, IgG: not available   Hepatitis B Surface Antigen: not available   HIV: not available   Sickle Cell Screen: not available  Gonorrhea: negative  Chlamydia: negative  Urine culture: positive, E coli date: 17    1 hour Glucose Tolerance Test: not available     Group B Strep: not done  Cystic Fibrosis Screen: not available  First Trimester Screen: not available  MSAFP/Multiple Markers: not available  Non-Invasive Prenatal Testing: not available  Anatomy US: posterior    ASSESSMENT & PLAN:  Yuki Garcia is a 23 y.o. female  at 34 weeks 2 days    - GBS unknown / Rh positive / R unknown   - No indication for GBS prophylaxis unless delivery is imminent     Abdominal Cramping    - SVE: fingertip/thick/high    - SSE: wnl, thick physiological discharge    - UA negative for UTI    - Vaginitis probe + candida    - GC/C pending    - Improved after IV fluids provided at [facility]. [facility]    - Denies contractions or LOF    - s/p 4g Mag bolus at [facility]. [place]    - Celestone ordered by ED physician, not given, however, not indicated at this time     E. Coli UTI    - + 17, did not have DANYELLE    - UA today negative Nitrites, few bacteria     Plan discussed with Dr. Ashley Park, who is agreeable. Steroids given this admission: No    Patient okay to be discharged home with Diflucan for candidal infection. Risks, benefits, alternatives and possible complications have been discussed in detail with the patient. Admission, and post admission procedures and expectations were discussed in detail. All questions were answered.     Attending's Name: Dr. Giorgi Alexander  Ob/Gyn Resident  2017, 1:34 AM     Date: 2017  Time: 10:02 AM      Patient Name: Yuki Garcia  Patient

## 2017-11-22 LAB
CULTURE: NORMAL
CULTURE: NORMAL
Lab: NORMAL
SPECIMEN DESCRIPTION: NORMAL
STATUS: NORMAL

## 2019-12-09 ENCOUNTER — APPOINTMENT (OUTPATIENT)
Dept: GENERAL RADIOLOGY | Age: 24
End: 2019-12-09
Payer: MEDICAID

## 2019-12-09 ENCOUNTER — HOSPITAL ENCOUNTER (EMERGENCY)
Age: 24
Discharge: HOME | End: 2019-12-09
Attending: EMERGENCY MEDICINE
Payer: MEDICAID

## 2019-12-09 VITALS
SYSTOLIC BLOOD PRESSURE: 111 MMHG | OXYGEN SATURATION: 96 % | RESPIRATION RATE: 16 BRPM | TEMPERATURE: 98.8 F | BODY MASS INDEX: 28.93 KG/M2 | HEIGHT: 66 IN | DIASTOLIC BLOOD PRESSURE: 71 MMHG | HEART RATE: 87 BPM | WEIGHT: 180 LBS

## 2019-12-09 DIAGNOSIS — N10 ACUTE PYELONEPHRITIS: Primary | ICD-10-CM

## 2019-12-09 LAB
-: ABNORMAL
AMORPHOUS: ABNORMAL
BACTERIA: ABNORMAL
BILIRUBIN URINE: NEGATIVE
CASTS UA: ABNORMAL /LPF (ref 0–2)
COLOR: ABNORMAL
COMMENT UA: ABNORMAL
CRYSTALS, UA: ABNORMAL /HPF
DIRECT EXAM: NORMAL
EPITHELIAL CELLS UA: ABNORMAL /HPF (ref 0–5)
GLUCOSE URINE: NEGATIVE
HCG(URINE) PREGNANCY TEST: NEGATIVE
KETONES, URINE: NEGATIVE
LEUKOCYTE ESTERASE, URINE: ABNORMAL
Lab: NORMAL
MUCUS: ABNORMAL
NITRITE, URINE: POSITIVE
OTHER OBSERVATIONS UA: ABNORMAL
PH UA: 7 (ref 5–6)
POC STREP A ANTIGEN: NEGATIVE
PROTEIN UA: ABNORMAL
RBC UA: ABNORMAL /HPF (ref 0–4)
RENAL EPITHELIAL, UA: ABNORMAL /HPF
SPECIFIC GRAVITY UA: 1.02 (ref 1.01–1.02)
SPECIMEN DESCRIPTION: NORMAL
TRICHOMONAS: ABNORMAL
TURBIDITY: ABNORMAL
URINE HGB: ABNORMAL
UROBILINOGEN, URINE: NORMAL
WBC UA: ABNORMAL /HPF (ref 0–4)
YEAST: ABNORMAL

## 2019-12-09 PROCEDURE — 87186 SC STD MICRODIL/AGAR DIL: CPT

## 2019-12-09 PROCEDURE — 81001 URINALYSIS AUTO W/SCOPE: CPT

## 2019-12-09 PROCEDURE — 6370000000 HC RX 637 (ALT 250 FOR IP): Performed by: EMERGENCY MEDICINE

## 2019-12-09 PROCEDURE — 87086 URINE CULTURE/COLONY COUNT: CPT

## 2019-12-09 PROCEDURE — 99283 EMERGENCY DEPT VISIT LOW MDM: CPT

## 2019-12-09 PROCEDURE — 87880 STREP A ASSAY W/OPTIC: CPT

## 2019-12-09 PROCEDURE — 71046 X-RAY EXAM CHEST 2 VIEWS: CPT

## 2019-12-09 PROCEDURE — 87088 URINE BACTERIA CULTURE: CPT

## 2019-12-09 PROCEDURE — 87804 INFLUENZA ASSAY W/OPTIC: CPT

## 2019-12-09 PROCEDURE — 81025 URINE PREGNANCY TEST: CPT

## 2019-12-09 RX ORDER — IBUPROFEN 600 MG/1
600 TABLET ORAL ONCE
Status: COMPLETED | OUTPATIENT
Start: 2019-12-09 | End: 2019-12-09

## 2019-12-09 RX ORDER — NITROFURANTOIN 25; 75 MG/1; MG/1
100 CAPSULE ORAL ONCE
Status: COMPLETED | OUTPATIENT
Start: 2019-12-09 | End: 2019-12-09

## 2019-12-09 RX ORDER — NITROFURANTOIN 25; 75 MG/1; MG/1
100 CAPSULE ORAL 2 TIMES DAILY
Qty: 10 CAPSULE | Refills: 0 | Status: SHIPPED | OUTPATIENT
Start: 2019-12-09 | End: 2019-12-14

## 2019-12-09 RX ADMIN — NITROFURANTOIN MONOHYDRATE/MACROCRYSTALLINE 100 MG: 25; 75 CAPSULE ORAL at 02:03

## 2019-12-09 RX ADMIN — IBUPROFEN 600 MG: 600 TABLET, FILM COATED ORAL at 01:03

## 2019-12-09 ASSESSMENT — PAIN SCALES - GENERAL: PAINLEVEL_OUTOF10: 8

## 2019-12-09 ASSESSMENT — ENCOUNTER SYMPTOMS: COUGH: 1

## 2019-12-09 ASSESSMENT — PAIN DESCRIPTION - LOCATION: LOCATION: GENERALIZED

## 2019-12-11 LAB
CULTURE: ABNORMAL
Lab: ABNORMAL
SPECIMEN DESCRIPTION: ABNORMAL

## 2020-07-30 ENCOUNTER — APPOINTMENT (OUTPATIENT)
Dept: CT IMAGING | Age: 25
End: 2020-07-30

## 2020-07-30 ENCOUNTER — HOSPITAL ENCOUNTER (EMERGENCY)
Age: 25
Discharge: HOME | End: 2020-07-30
Attending: EMERGENCY MEDICINE

## 2020-07-30 VITALS
HEIGHT: 66 IN | RESPIRATION RATE: 18 BRPM | HEART RATE: 103 BPM | SYSTOLIC BLOOD PRESSURE: 103 MMHG | WEIGHT: 180 LBS | DIASTOLIC BLOOD PRESSURE: 53 MMHG | OXYGEN SATURATION: 98 % | BODY MASS INDEX: 28.93 KG/M2 | TEMPERATURE: 99.1 F

## 2020-07-30 LAB
-: ABNORMAL
ABSOLUTE EOS #: 0.06 K/UL (ref 0–0.44)
ABSOLUTE IMMATURE GRANULOCYTE: 0.09 K/UL (ref 0–0.3)
ABSOLUTE LYMPH #: 1.57 K/UL (ref 1.1–3.7)
ABSOLUTE MONO #: 1.5 K/UL (ref 0.1–1.2)
AMORPHOUS: ABNORMAL
ANION GAP SERPL CALCULATED.3IONS-SCNC: 12 MMOL/L (ref 9–17)
BACTERIA: ABNORMAL
BASOPHILS # BLD: 0 % (ref 0–2)
BASOPHILS ABSOLUTE: 0.06 K/UL (ref 0–0.2)
BILIRUBIN URINE: NEGATIVE
BUN BLDV-MCNC: 9 MG/DL (ref 6–20)
BUN/CREAT BLD: 13 (ref 9–20)
CALCIUM SERPL-MCNC: 8.6 MG/DL (ref 8.6–10.4)
CASTS UA: ABNORMAL /LPF
CHLORIDE BLD-SCNC: 101 MMOL/L (ref 98–107)
CHP ED QC CHECK: YES
CO2: 23 MMOL/L (ref 20–31)
COLOR: YELLOW
COMMENT UA: ABNORMAL
CREAT SERPL-MCNC: 0.68 MG/DL (ref 0.5–0.9)
CRYSTALS, UA: ABNORMAL /HPF
DIFFERENTIAL TYPE: ABNORMAL
EOSINOPHILS RELATIVE PERCENT: 0 % (ref 1–4)
EPITHELIAL CELLS UA: ABNORMAL /HPF (ref 0–5)
GFR AFRICAN AMERICAN: >60 ML/MIN
GFR NON-AFRICAN AMERICAN: >60 ML/MIN
GFR SERPL CREATININE-BSD FRML MDRD: ABNORMAL ML/MIN/{1.73_M2}
GFR SERPL CREATININE-BSD FRML MDRD: ABNORMAL ML/MIN/{1.73_M2}
GLUCOSE BLD-MCNC: 116 MG/DL (ref 70–99)
GLUCOSE URINE: NEGATIVE
HCT VFR BLD CALC: 38.5 % (ref 36.3–47.1)
HEMOGLOBIN: 12.5 G/DL (ref 11.9–15.1)
IMMATURE GRANULOCYTES: 1 %
KETONES, URINE: NEGATIVE
LEUKOCYTE ESTERASE, URINE: ABNORMAL
LYMPHOCYTES # BLD: 10 % (ref 24–43)
MCH RBC QN AUTO: 28.2 PG (ref 25.2–33.5)
MCHC RBC AUTO-ENTMCNC: 32.5 G/DL (ref 28.4–34.8)
MCV RBC AUTO: 86.7 FL (ref 82.6–102.9)
MONOCYTES # BLD: 10 % (ref 3–12)
MUCUS: ABNORMAL
NITRITE, URINE: POSITIVE
NRBC AUTOMATED: 0 PER 100 WBC
OTHER OBSERVATIONS UA: ABNORMAL
PDW BLD-RTO: 13.2 % (ref 11.8–14.4)
PH UA: 6 (ref 5–8)
PLATELET # BLD: 289 K/UL (ref 138–453)
PLATELET ESTIMATE: ABNORMAL
PMV BLD AUTO: 8.8 FL (ref 8.1–13.5)
POTASSIUM SERPL-SCNC: 3.9 MMOL/L (ref 3.7–5.3)
PREGNANCY TEST URINE, POC: NORMAL
PROTEIN UA: ABNORMAL
RBC # BLD: 4.44 M/UL (ref 3.95–5.11)
RBC # BLD: ABNORMAL 10*6/UL
RBC UA: ABNORMAL /HPF (ref 0–2)
RENAL EPITHELIAL, UA: ABNORMAL /HPF
SEG NEUTROPHILS: 79 % (ref 36–65)
SEGMENTED NEUTROPHILS ABSOLUTE COUNT: 12.21 K/UL (ref 1.5–8.1)
SODIUM BLD-SCNC: 136 MMOL/L (ref 135–144)
SPECIFIC GRAVITY UA: 1.02 (ref 1–1.03)
TRICHOMONAS: ABNORMAL
TURBIDITY: ABNORMAL
URINE HGB: ABNORMAL
UROBILINOGEN, URINE: ABNORMAL
WBC # BLD: 15.5 K/UL (ref 3.5–11.3)
WBC # BLD: ABNORMAL 10*3/UL
WBC UA: ABNORMAL /HPF (ref 0–5)
YEAST: ABNORMAL

## 2020-07-30 PROCEDURE — 87086 URINE CULTURE/COLONY COUNT: CPT

## 2020-07-30 PROCEDURE — 99284 EMERGENCY DEPT VISIT MOD MDM: CPT

## 2020-07-30 PROCEDURE — 87186 SC STD MICRODIL/AGAR DIL: CPT

## 2020-07-30 PROCEDURE — 96365 THER/PROPH/DIAG IV INF INIT: CPT

## 2020-07-30 PROCEDURE — 81001 URINALYSIS AUTO W/SCOPE: CPT

## 2020-07-30 PROCEDURE — 2580000003 HC RX 258: Performed by: PHYSICIAN ASSISTANT

## 2020-07-30 PROCEDURE — 81025 URINE PREGNANCY TEST: CPT

## 2020-07-30 PROCEDURE — 96375 TX/PRO/DX INJ NEW DRUG ADDON: CPT

## 2020-07-30 PROCEDURE — 6360000002 HC RX W HCPCS: Performed by: PHYSICIAN ASSISTANT

## 2020-07-30 PROCEDURE — 85025 COMPLETE CBC W/AUTO DIFF WBC: CPT

## 2020-07-30 PROCEDURE — 74176 CT ABD & PELVIS W/O CONTRAST: CPT

## 2020-07-30 PROCEDURE — 87088 URINE BACTERIA CULTURE: CPT

## 2020-07-30 PROCEDURE — 80048 BASIC METABOLIC PNL TOTAL CA: CPT

## 2020-07-30 RX ORDER — CEPHALEXIN 500 MG/1
500 CAPSULE ORAL 2 TIMES DAILY
Qty: 20 CAPSULE | Refills: 0 | Status: SHIPPED | OUTPATIENT
Start: 2020-07-30 | End: 2020-08-09

## 2020-07-30 RX ORDER — KETOROLAC TROMETHAMINE 30 MG/ML
30 INJECTION, SOLUTION INTRAMUSCULAR; INTRAVENOUS ONCE
Status: COMPLETED | OUTPATIENT
Start: 2020-07-30 | End: 2020-07-30

## 2020-07-30 RX ORDER — ONDANSETRON 4 MG/1
4 TABLET, ORALLY DISINTEGRATING ORAL EVERY 8 HOURS PRN
Qty: 20 TABLET | Refills: 0 | Status: SHIPPED | OUTPATIENT
Start: 2020-07-30

## 2020-07-30 RX ORDER — 0.9 % SODIUM CHLORIDE 0.9 %
1000 INTRAVENOUS SOLUTION INTRAVENOUS ONCE
Status: COMPLETED | OUTPATIENT
Start: 2020-07-30 | End: 2020-07-30

## 2020-07-30 RX ORDER — ONDANSETRON 2 MG/ML
4 INJECTION INTRAMUSCULAR; INTRAVENOUS ONCE
Status: COMPLETED | OUTPATIENT
Start: 2020-07-30 | End: 2020-07-30

## 2020-07-30 RX ORDER — IBUPROFEN 800 MG/1
TABLET ORAL EVERY 6 HOURS PRN
COMMUNITY
End: 2021-12-03

## 2020-07-30 RX ORDER — HYDROCODONE BITARTRATE AND ACETAMINOPHEN 5; 325 MG/1; MG/1
1-2 TABLET ORAL EVERY 8 HOURS PRN
Qty: 12 TABLET | Refills: 0 | Status: SHIPPED | OUTPATIENT
Start: 2020-07-30 | End: 2020-08-02

## 2020-07-30 RX ADMIN — KETOROLAC TROMETHAMINE 30 MG: 30 INJECTION, SOLUTION INTRAMUSCULAR at 17:12

## 2020-07-30 RX ADMIN — CEFTRIAXONE SODIUM 1 G: 1 INJECTION, POWDER, FOR SOLUTION INTRAMUSCULAR; INTRAVENOUS at 17:56

## 2020-07-30 RX ADMIN — ONDANSETRON 4 MG: 2 INJECTION INTRAMUSCULAR; INTRAVENOUS at 17:12

## 2020-07-30 RX ADMIN — SODIUM CHLORIDE 1000 ML: 9 INJECTION, SOLUTION INTRAVENOUS at 17:12

## 2020-07-30 ASSESSMENT — PAIN SCALES - GENERAL
PAINLEVEL_OUTOF10: 5
PAINLEVEL_OUTOF10: 8

## 2020-07-30 NOTE — ED PROVIDER NOTES
[address] [place] [address] ED  [facility]      Pt Name: Noe Hernandez  MRN: [id number]  [place] 1995  Date ofevaluation: 7/30/2020  Provider: Landon Kan Dr       Chief Complaint   Patient presents with    Abdominal Pain    Back Pain    Fever         HISTORY OF PRESENT ILLNESS  (Location/Symptom, Timing/Onset, Context/Setting, Quality, Duration, Modifying Factors, Severity.)   Noe Hernandez is a 23 y.o. female who presents to the emergency department with back pain and fever along with pressure with urination for the last 2 to 3 days. Temperature has been as high as 102 yesterday. Patient denies any personal history of kidney stones. Previously she has same symptoms in December 2019 and had a kidney infection. No definite aggravating factors. Symptom described as mild to moderate and constant. Nursing Notes were reviewed. ALLERGIES     Patient has no known allergies. CURRENT MEDICATIONS       Discharge Medication List as of 7/30/2020  6:43 PM      CONTINUE these medications which have NOT CHANGED    Details   Acetaminophen (TYLENOL 8 HOUR PO) Take by mouthHistorical Med      ibuprofen (ADVIL;MOTRIN) 800 MG tablet Take by mouth every 6 hours as needed for PainHistorical Med             PAST MEDICAL HISTORY   History reviewed. No pertinent past medical history. SURGICAL HISTORY     History reviewed. No pertinent surgical history. FAMILY HISTORY     History reviewed. No pertinent family history. No family status information on file. SOCIAL HISTORY      reports that she has been smoking. She has been smoking about 0.50 packs per day. She has never used smokeless tobacco. She reports that she does not drink alcohol or use drugs. REVIEW OFSYSTEMS    (2-9 systems for level 4, 10 or more for level 5)   Review of Systems    Except as noted above the remainder of the review of systems was reviewed and negative.      PHYSICAL EXAM    (up to 7 for level 4, 8 or more for level 5)     ED Triage Vitals [07/30/20 1639]   BP Temp Temp Source Pulse Resp SpO2 Height Weight   109/60 98.8 °F (37.1 °C) Oral 128 18 100 % 5' 6\" (1.676 m) 180 lb (81.6 kg)      Physical Exam  Constitutional:       Appearance: She is well-developed. HENT:      Head: Normocephalic and atraumatic. Neck:      Musculoskeletal: Normal range of motion and neck supple. Cardiovascular:      Rate and Rhythm: Normal rate and regular rhythm. Pulmonary:      Effort: Pulmonary effort is normal.      Breath sounds: Normal breath sounds. Abdominal:      General: There is no distension. Palpations: Abdomen is soft. Tenderness: There is no abdominal tenderness. There is left CVA tenderness. Musculoskeletal: Normal range of motion. Skin:     General: Skin is warm. Findings: No rash. Neurological:      Mental Status: She is alert and oriented to person, place, and time.    Psychiatric:         Behavior: Behavior normal.                 DIAGNOSTIC RESULTS     EKG: All EKG's are interpreted by the Emergency Department Physician who either signs or Co-signs this chart in the absence of a cardiologist.        RADIOLOGY:   Non-plain film images such as CT, Ultrasound and MRI are read by the radiologist. Plain radiographic images arevisualized and preliminarily interpreted by the emergency physician with the below findings:        Interpretation per the Radiologist below, if available at thetime of this note:          ED BEDSIDE ULTRASOUND:   Performed by ED Physician - none    LABS:  Labs Reviewed   URINE RT REFLEX TO CULTURE - Abnormal; Notable for the following components:       Result Value    Turbidity UA TURBID (*)     Urine Hgb 2+ (*)     Protein, UA 1+ (*)     Urobilinogen, Urine ELEVATED (*)     Nitrite, Urine POSITIVE (*)     Leukocyte Esterase, Urine LARGE (*)     All other components within normal limits   CBC WITH AUTO DIFFERENTIAL - Abnormal; Notable for the following components:    WBC 15.5 (*)     Seg Neutrophils 79 (*)     Lymphocytes 10 (*)     Eosinophils % 0 (*)     Immature Granulocytes 1 (*)     Segs Absolute 12.21 (*)     Absolute Mono # 1.50 (*)     All other components within normal limits   BASIC METABOLIC PANEL - Abnormal; Notable for the following components:    Glucose 116 (*)     All other components within normal limits   MICROSCOPIC URINALYSIS - Abnormal; Notable for the following components:    Bacteria, UA MANY (*)     All other components within normal limits   POCT URINE PREGNANCY - Normal   CULTURE, URINE   POCT URINALYSIS DIPSTICK       All other labs were within normal range or not returned as of this dictation. EMERGENCY DEPARTMENT COURSE and DIFFERENTIAL DIAGNOSIS/MDM:   Vitals:    Vitals:    07/30/20 1639 07/30/20 1832   BP: 109/60 (!) 103/53   Pulse: 128 103   Resp: 18 18   Temp: 98.8 °F (37.1 °C) 99.1 °F (37.3 °C)   TempSrc: Oral Oral   SpO2: 100% 98%   Weight: 180 lb (81.6 kg)    Height: 5' 6\" (1.676 m)      Work up shows uti. Patient is nonseptic and nontoxic appearing. Patient could of passed  A stone. Given IV fluids and Rocephin here in ER. No vomiting. Patient was treated with pain medicine, Keflex and discharged home with  Symptomatic management and urology follow-up. CONSULTS:  None    PROCEDURES:  Procedures        FINAL IMPRESSION      1. Acute pyelonephritis    2. Kidney stone          DISPOSITION/PLAN   DISPOSITION Decision To Discharge 07/30/2020 06:40:23 PM      PATIENTREFERRED TO:   Coleen Oliveira MD  [address] [address], [address]  [address] [facility] [address]  [phone]    In 3 days        DISCHARGE MEDICATIONS:     Discharge Medication List as of 7/30/2020  6:43 PM      START taking these medications    Details   HYDROcodone-acetaminophen (NORCO) 5-325 MG per tablet Take 1-2 tablets by mouth every 8 hours as needed for Pain for up to 3 days. , Disp-12 tablet,R-0Print      ondansetron (ZOFRAN ODT) 4 MG disintegrating tablet

## 2020-07-30 NOTE — ED NOTES
Pt arrived with c/o abd pain for 4 days and fever for 2. Pt states she has been taking Advil 800 to help with fever. She states she has bladder pressure when she has to urinate. Has hx kidney infection and feels like that only worse.         Balbir Prescott RN  07/30/20 4651

## 2020-07-31 LAB — HCG, PREGNANCY URINE (POC): NEGATIVE

## 2020-08-01 LAB
CULTURE: ABNORMAL
Lab: ABNORMAL
SPECIMEN DESCRIPTION: ABNORMAL

## 2021-12-03 ENCOUNTER — HOSPITAL ENCOUNTER (EMERGENCY)
Age: 26
Discharge: HOME | End: 2021-12-03
Attending: EMERGENCY MEDICINE
Payer: MEDICARE

## 2021-12-03 VITALS
DIASTOLIC BLOOD PRESSURE: 59 MMHG | SYSTOLIC BLOOD PRESSURE: 105 MMHG | RESPIRATION RATE: 16 BRPM | BODY MASS INDEX: 32.14 KG/M2 | HEIGHT: 66 IN | TEMPERATURE: 99.2 F | HEART RATE: 86 BPM | WEIGHT: 200 LBS | OXYGEN SATURATION: 98 %

## 2021-12-03 DIAGNOSIS — N30.00 ACUTE CYSTITIS WITHOUT HEMATURIA: ICD-10-CM

## 2021-12-03 DIAGNOSIS — R10.9 FLANK PAIN: Primary | ICD-10-CM

## 2021-12-03 LAB
-: ABNORMAL
AMORPHOUS: ABNORMAL
BACTERIA: ABNORMAL
BILIRUBIN URINE: NEGATIVE
CASTS UA: ABNORMAL /LPF (ref 0–2)
COLOR: ABNORMAL
COMMENT UA: ABNORMAL
CRYSTALS, UA: ABNORMAL /HPF
EPITHELIAL CELLS UA: ABNORMAL /HPF (ref 0–5)
GLUCOSE URINE: NEGATIVE
HCG(URINE) PREGNANCY TEST: NEGATIVE
KETONES, URINE: NEGATIVE
LEUKOCYTE ESTERASE, URINE: NEGATIVE
MUCUS: ABNORMAL
NITRITE, URINE: NEGATIVE
OTHER OBSERVATIONS UA: ABNORMAL
PH UA: 7 (ref 5–6)
PROTEIN UA: NEGATIVE
RBC UA: ABNORMAL /HPF (ref 0–4)
RENAL EPITHELIAL, UA: ABNORMAL /HPF
SPECIFIC GRAVITY UA: 1.01 (ref 1.01–1.02)
TRICHOMONAS: ABNORMAL
TURBIDITY: ABNORMAL
URINE HGB: NEGATIVE
UROBILINOGEN, URINE: NORMAL
WBC UA: ABNORMAL /HPF (ref 0–4)
YEAST: ABNORMAL

## 2021-12-03 PROCEDURE — 81025 URINE PREGNANCY TEST: CPT

## 2021-12-03 PROCEDURE — 99284 EMERGENCY DEPT VISIT MOD MDM: CPT

## 2021-12-03 PROCEDURE — 81001 URINALYSIS AUTO W/SCOPE: CPT

## 2021-12-03 PROCEDURE — 96372 THER/PROPH/DIAG INJ SC/IM: CPT

## 2021-12-03 PROCEDURE — 6360000002 HC RX W HCPCS: Performed by: EMERGENCY MEDICINE

## 2021-12-03 PROCEDURE — 6370000000 HC RX 637 (ALT 250 FOR IP): Performed by: EMERGENCY MEDICINE

## 2021-12-03 RX ORDER — NITROFURANTOIN 25; 75 MG/1; MG/1
100 CAPSULE ORAL ONCE
Status: COMPLETED | OUTPATIENT
Start: 2021-12-03 | End: 2021-12-03

## 2021-12-03 RX ORDER — ONDANSETRON 4 MG/1
4 TABLET, ORALLY DISINTEGRATING ORAL ONCE
Status: COMPLETED | OUTPATIENT
Start: 2021-12-03 | End: 2021-12-03

## 2021-12-03 RX ORDER — KETOROLAC TROMETHAMINE 30 MG/ML
30 INJECTION, SOLUTION INTRAMUSCULAR; INTRAVENOUS ONCE
Status: COMPLETED | OUTPATIENT
Start: 2021-12-03 | End: 2021-12-03

## 2021-12-03 RX ORDER — NITROFURANTOIN 25; 75 MG/1; MG/1
100 CAPSULE ORAL 2 TIMES DAILY
Qty: 14 CAPSULE | Refills: 0 | Status: SHIPPED | OUTPATIENT
Start: 2021-12-03 | End: 2021-12-10

## 2021-12-03 RX ORDER — IBUPROFEN 800 MG/1
800 TABLET ORAL EVERY 8 HOURS PRN
Qty: 30 TABLET | Refills: 0 | Status: SHIPPED | OUTPATIENT
Start: 2021-12-03

## 2021-12-03 RX ADMIN — ONDANSETRON 4 MG: 4 TABLET, ORALLY DISINTEGRATING ORAL at 18:15

## 2021-12-03 RX ADMIN — NITROFURANTOIN MONOHYDRATE/MACROCRYSTALLINE 100 MG: 25; 75 CAPSULE ORAL at 18:56

## 2021-12-03 RX ADMIN — KETOROLAC TROMETHAMINE 30 MG: 30 INJECTION, SOLUTION INTRAMUSCULAR; INTRAVENOUS at 18:15

## 2021-12-03 ASSESSMENT — PAIN SCALES - GENERAL
PAINLEVEL_OUTOF10: 9
PAINLEVEL_OUTOF10: 7
PAINLEVEL_OUTOF10: 9

## 2021-12-03 ASSESSMENT — PAIN DESCRIPTION - PAIN TYPE
TYPE: ACUTE PAIN
TYPE: ACUTE PAIN

## 2021-12-03 ASSESSMENT — PAIN DESCRIPTION - LOCATION
LOCATION: FLANK
LOCATION: FLANK

## 2021-12-03 ASSESSMENT — PAIN DESCRIPTION - ORIENTATION
ORIENTATION: LEFT
ORIENTATION: LEFT

## 2021-12-03 NOTE — ED PROVIDER NOTES
[facility] ED  [address]  [place] [address]  Phone: [phone]  [place]      Pt Name: Calvin Siu  MRN: [id number]  [place] 1995  Date of evaluation: 12/3/2021    CHIEF COMPLAINT       Chief Complaint   Patient presents with    Flank Pain     l. sided, pain w. urinating. Hx of kidney infection/stones. HISTORY OF PRESENT ILLNESS    Giselle Ramos is a 27 y.o. female who presents to the emergency room complaining of 9 out of 10 left flank pain that started progressively worsening over the past couple days. He admits to dysuria. History of pyelonephritis and kidney stones. She says this feels more like an infection that does a stone. She took ibuprofen early this morning otherwise has not had anything else for pain. She has been nauseated without vomiting. Currently on her menstrual cycle. No fevers or chills. Last kidney stone was 2 years ago. REVIEW OF SYSTEMS       Constitutional: No fevers or chills   HEENT: No sore throat, rhinorrhea, or earache   Eyes: No blurry vision or double vision no drainage   Cardiovascular: No chest pain or tachycardia   Respiratory: No wheezing or shortness of breath no cough   Gastrointestinal: Positive nausea, no vomiting, diarrhea, constipation, or abdominal pain   : No hematuria positive dysuria   Musculoskeletal: No extremity swelling or pain positive left leg pain  Skin: No rash   Neurological: No focal neurologic complaints, paresthesias, weakness, or headache     PAST MEDICAL HISTORY    has no past medical history on file. SURGICAL HISTORY      has no past surgical history on file.     CURRENT MEDICATIONS       Previous Medications    ACETAMINOPHEN (TYLENOL 8 HOUR PO)    Take by mouth    IBUPROFEN (ADVIL;MOTRIN) 800 MG TABLET    Take by mouth every 6 hours as needed for Pain    ONDANSETRON (ZOFRAN ODT) 4 MG DISINTEGRATING TABLET    Take 1 tablet by mouth every 8 hours as needed for Nausea Turbidity UA NOT REPORTED Clear    Glucose, Ur NEGATIVE NEGATIVE    Bilirubin Urine NEGATIVE NEGATIVE    Ketones, Urine NEGATIVE NEGATIVE    Specific Gravity, UA 1.015 1.010 - 1.025    Urine Hgb NEGATIVE NEGATIVE    pH, UA 7.0 (H) 5.0 - 6.0    Protein, UA NEGATIVE NEGATIVE    Urobilinogen, Urine Normal Normal    Nitrite, Urine NEGATIVE NEGATIVE    Leukocyte Esterase, Urine NEGATIVE NEGATIVE    Urinalysis Comments NOT REPORTED    Pregnancy, Urine   Result Value Ref Range    HCG(Urine) Pregnancy Test NEGATIVE NEGATIVE   Microscopic Urinalysis   Result Value Ref Range    -          WBC, UA 0 TO 4 0 - 4 /HPF    RBC, UA 0 TO 4 0 - 4 /HPF    Casts UA NOT REPORTED 0 - 2 /LPF    Crystals, UA NOT REPORTED None /HPF    Epithelial Cells UA 5 TO 10 0 - 5 /HPF    Renal Epithelial, UA NOT REPORTED 0 /HPF    Bacteria, UA TRACE (A) None    Mucus, UA NOT REPORTED None    Trichomonas, UA NOT REPORTED None    Amorphous, UA NOT REPORTED None    Other Observations UA NOT REPORTED NOT REQ.     Yeast, UA NOT REPORTED None       Not indicated unless otherwise documented above    RADIOLOGY:   I reviewed the radiologist interpretations:    No orders to display       Not indicated unless otherwise documented above    EMERGENCY DEPARTMENT COURSE:     The patient was given the following medications:  Orders Placed This Encounter   Medications    ketorolac (TORADOL) injection 30 mg    ondansetron (ZOFRAN-ODT) disintegrating tablet 4 mg    nitrofurantoin (macrocrystal-monohydrate) (MACROBID) capsule 100 mg     Order Specific Question:   Antimicrobial Indications     Answer:   Urinary Tract Infection    nitrofurantoin, macrocrystal-monohydrate, (MACROBID) 100 MG capsule     Sig: Take 1 capsule by mouth 2 times daily for 7 days     Dispense:  14 capsule     Refill:  0        Vitals:   -------------------------  /73   Pulse 86   Temp 99.2 °F (37.3 °C) (Tympanic)   Resp 16   Ht 5' 6\" (1.676 m)   Wt 200 lb (90.7 kg)   LMP 12/03/2021 SpO2 98%   BMI 32.28 kg/m²     6:50 PM urine pregnancy negative. Urinalysis also unremarkable. With flank pain and history of UTI as well as pyelonephritis will treat for infection with Macrobid. Follow-up with family physician for reevaluation return if worsening symptoms or other concerns. Increase fluids as tolerated. Motrin or Tylenol for pain      The patient understands that at this time there is no evidence for a more malignant underlying process, but also understands that early in the process of an illness or injury, an emergency department workup can be falsely reassuring. Routine discharge counseling was given, and it is understood that worsening, changing or persistent symptoms should prompt an immediate call or follow up with their primary physician or return to the emergency department. The importance of appropriate follow up was also discussed. I have reviewed the disposition diagnosis. I have answered the questions and given discharge instructions. There was voiced understanding of these instructions and no further questions or complaints. CRITICAL CARE:    None    CONSULTS:    None    PROCEDURES:    None      OARRS Report if indicated             FINAL IMPRESSION      1. Flank pain    2. Acute cystitis without hematuria          DISPOSITION/PLAN   DISPOSITION Discharge - Pending Orders Complete 12/03/2021 06:48:38 PM        CONDITION ON DISPOSITION: STABLE       PATIENT REFERRED TO:  No follow-up provider specified.     DISCHARGE MEDICATIONS:  New Prescriptions    NITROFURANTOIN, MACROCRYSTAL-MONOHYDRATE, (MACROBID) 100 MG CAPSULE    Take 1 capsule by mouth 2 times daily for 7 days       (Please note that portions of this note were completed with a voice recognition program.  Efforts were made to edit the dictations but occasionally words are mis-transcribed.)    Nery Chiu DO   Attending Emergency Physician      Nery Chiu DO  12/03/21 1748